# Patient Record
Sex: MALE | Race: OTHER | NOT HISPANIC OR LATINO | URBAN - METROPOLITAN AREA
[De-identification: names, ages, dates, MRNs, and addresses within clinical notes are randomized per-mention and may not be internally consistent; named-entity substitution may affect disease eponyms.]

---

## 2021-05-04 ENCOUNTER — EMERGENCY (EMERGENCY)
Facility: HOSPITAL | Age: 56
LOS: 0 days | Discharge: HOME | End: 2021-05-04
Attending: EMERGENCY MEDICINE
Payer: SELF-PAY

## 2021-05-04 VITALS
HEIGHT: 64 IN | HEART RATE: 89 BPM | OXYGEN SATURATION: 94 % | RESPIRATION RATE: 18 BRPM | WEIGHT: 169.98 LBS | DIASTOLIC BLOOD PRESSURE: 97 MMHG | SYSTOLIC BLOOD PRESSURE: 162 MMHG | TEMPERATURE: 98 F

## 2021-05-04 VITALS
DIASTOLIC BLOOD PRESSURE: 69 MMHG | RESPIRATION RATE: 16 BRPM | OXYGEN SATURATION: 99 % | TEMPERATURE: 98 F | SYSTOLIC BLOOD PRESSURE: 128 MMHG | HEART RATE: 75 BPM

## 2021-05-04 DIAGNOSIS — F10.120 ALCOHOL ABUSE WITH INTOXICATION, UNCOMPLICATED: ICD-10-CM

## 2021-05-04 PROCEDURE — 99284 EMERGENCY DEPT VISIT MOD MDM: CPT

## 2021-05-04 PROCEDURE — 99053 MED SERV 10PM-8AM 24 HR FAC: CPT

## 2021-05-04 RX ORDER — ACETAMINOPHEN 500 MG
650 TABLET ORAL ONCE
Refills: 0 | Status: COMPLETED | OUTPATIENT
Start: 2021-05-04 | End: 2021-05-04

## 2021-05-04 RX ADMIN — Medication 650 MILLIGRAM(S): at 08:51

## 2021-05-04 NOTE — ED ADULT TRIAGE NOTE - CHIEF COMPLAINT QUOTE
BIBA,  intoxication.  pt found sleeping in front of store.  pt aaox3 in triage.  pt admits to drinking beer,  daily drinker BIBA,  intoxication.  pt found sleeping in front of store.  pt aaox3 in triage.  pt admits to drinking beer,  daily drinker.  FS in triage 114

## 2021-05-04 NOTE — ED ADULT NURSE NOTE - NSIMPLEMENTINTERV_GEN_ALL_ED
Implemented All Fall Risk Interventions:  [place] to call system. Call bell, personal items and telephone within reach. Instruct patient to call for assistance. Room bathroom lighting operational. Non-slip footwear when patient is off stretcher. Physically safe environment: no spills, clutter or unnecessary equipment. Stretcher in lowest position, wheels locked, appropriate side rails in place. Provide visual cue, wrist band, yellow gown, etc. Monitor gait and stability. Monitor for mental status changes and reorient to person, place, and time. Review medications for side effects contributing to fall risk. Reinforce activity limits and safety measures with patient and family.

## 2021-05-04 NOTE — ED PROVIDER NOTE - OBJECTIVE STATEMENT
56M no relevant med hx pw etoh intox. pt reports that he was drinking beers after getting off work late and then took a rest outside of a store. he doesn't understand why he was brought here. fdny reports he was found sleeping outside of a [employer] and the police called. pt denies ha, vision loss, rhinorrhea, cp, sob, abd pain, vomiting, fever, rash, bleeding, numbness, dysuria, sucidality

## 2021-05-04 NOTE — ED ADULT NURSE REASSESSMENT NOTE - NS ED NURSE REASSESS COMMENT FT1
Patient able to be woken up and assessed. Patient does not know where he is and is reoriented to situation and location. Patient states he has a headache but can not rate it a number. Patient able to ambulate to bathroom, steady gait. Provided red non-slip socks.

## 2021-05-04 NOTE — ED PROVIDER NOTE - PROGRESS NOTE DETAILS
patient awake, alert, eating in ED, sober enough for safe discharge. will d/c patient awake, alert, eating in ED, sober enough for safe discharge. will d/c. patient with mild headache, denies trauma, head atraumatic. likely etoh induced. Tylenol given

## 2021-05-04 NOTE — ED PROVIDER NOTE - PHYSICAL EXAMINATION
Gen: Alert, NAD  Head: NC, AT   Eyes: PERRL, EOMI, normal lids/conjunctiva  ENT: normal hearing, patent oropharynx without erythema/exudate, uvula midline  Neck: supple, no tenderness, Trachea midline  Pulm: Bilateral BS, normal resp effort, no wheeze/stridor/retractions  CV: RRR, no M/R/G, 2+ radial and dp pulses bl, no edema  Abd: soft, NT/ND, +BS, no hepatosplenomegaly  Mskel: extremities x4 with normal ROM and no joint effusions. no ctl spine ttp.   Skin: no rash, no bruising   Neuro: AAOx3, no sensory/motor deficits, CN 2-12 intact. slurring speech, appears intoxicated

## 2021-05-04 NOTE — ED ADULT NURSE NOTE - OBJECTIVE STATEMENT
BIBA,  intoxication.  pt found sleeping in front of store.  pt aaox3 in triage.  pt admits to drinking beer,  daily drinker

## 2021-05-04 NOTE — ED PROVIDER NOTE - PATIENT PORTAL LINK FT
You can access the FollowMyHealth Patient Portal offered by [facility] by registering at the following website: [email or web address]. By joining [facility]’s FollowMyHealth portal, you will also be able to view your health information using other applications (apps) compatible with our system.

## 2021-05-04 NOTE — ED ADULT NURSE REASSESSMENT NOTE - NS ED NURSE REASSESS COMMENT FT1
Patient received from WILLIE Selby in bed 22. Patient is sleeping in bed. Safety and environmental checks maintained. Patient received from WILLIE Selby in bed 22. Patient is sleeping in bed. Safety and environmental checks maintained. Fingerstick 114.

## 2021-05-05 LAB — GLUCOSE BLDC GLUCOMTR-MCNC: 114 MG/DL — HIGH (ref 70–99)

## 2023-06-15 NOTE — ED PROVIDER NOTE - NS ED MD EM SELECTION
[id number] Comprehensive [Right] : right knee [NL (0)] : extension 0 degrees [5___] : hamstring 5[unfilled]/5 [Equivocal] : equivocal [place] [] : no calf tenderness [[id number]] : flexion 110 degrees